# Patient Record
Sex: MALE | Race: WHITE | NOT HISPANIC OR LATINO | URBAN - NONMETROPOLITAN AREA
[De-identification: names, ages, dates, MRNs, and addresses within clinical notes are randomized per-mention and may not be internally consistent; named-entity substitution may affect disease eponyms.]

---

## 2017-01-24 ENCOUNTER — OFFICE VISIT (OUTPATIENT)
Dept: FAMILY MEDICINE CLINIC | Facility: CLINIC | Age: 32
End: 2017-01-24

## 2017-01-24 VITALS
SYSTOLIC BLOOD PRESSURE: 112 MMHG | TEMPERATURE: 98 F | OXYGEN SATURATION: 99 % | DIASTOLIC BLOOD PRESSURE: 82 MMHG | WEIGHT: 174 LBS | HEART RATE: 45 BPM | BODY MASS INDEX: 23.57 KG/M2 | HEIGHT: 72 IN

## 2017-01-24 DIAGNOSIS — M54.2 NECK PAIN: Primary | ICD-10-CM

## 2017-01-24 DIAGNOSIS — M54.9 MID BACK PAIN: ICD-10-CM

## 2017-01-24 PROCEDURE — 99202 OFFICE O/P NEW SF 15 MIN: CPT | Performed by: NURSE PRACTITIONER

## 2017-01-24 RX ORDER — CYCLOBENZAPRINE HCL 5 MG
5 TABLET ORAL 3 TIMES DAILY PRN
Qty: 30 TABLET | Refills: 1 | Status: SHIPPED | OUTPATIENT
Start: 2017-01-24 | End: 2017-02-07 | Stop reason: SDUPTHER

## 2017-01-24 NOTE — PROGRESS NOTES
Subjective   Casimiro Love is a 31 y.o. male.     History of Present Illness     Neck and neck pain  Casimiro is a new patient to the clinic here today to establish care and discuss some back and neck issues he's been having for about 8 years or so.  He reports pain in his mid back on the right side radiating up to the neck and down the right arm.  He does have some numbness and tingling in his right arm periodically.  In the past he has been seen by previous PCP for this, they gave him some exercises to try which did help.  He also did massage therapy through PT which helped tremendously.  No x-rays have been performed he's aware.  He denies any actual injury.    The following portions of the patient's history were reviewed and updated as appropriate: allergies, current medications, past family history, past medical history, past social history, past surgical history and problem list.    Review of Systems   Constitutional: Negative.    Respiratory: Negative.    Cardiovascular: Negative.    Genitourinary: Negative.    Musculoskeletal: Positive for back pain and neck pain. Negative for arthralgias, joint swelling, myalgias and neck stiffness.   Neurological: Negative.        Objective   Physical Exam   Constitutional: He appears well-developed and well-nourished.   HENT:   Head: Normocephalic.   Eyes: Conjunctivae are normal.   Neck: Normal range of motion. Neck supple.   Cardiovascular: Normal rate and regular rhythm.    Pulmonary/Chest: Effort normal and breath sounds normal.   Musculoskeletal:   Full active range of motion of the lower and mid back as well as neck.  He does have palpated paraspinal muscle spasms on the right side as well as the left, 2+ upper extremity DTRs with a  equal .       Assessment/Plan   Casimiro was seen today for establish care.    Diagnoses and all orders for this visit:    Neck pain    Mid back pain    Other orders  -     cyclobenzaprine (FLEXERIL) 5 MG tablet; Take 1 tablet by  mouth 3 (Three) Times a Day As Needed for muscle spasms.     seems most of his issues are musculoskeletal as far as muscle spasm.  He has been buying some type of muscle relaxer off the Internet which is helped a little bit he is out..  In the past massage therapies worked greatly.  He does now have some pierced he just on the right arm periodically, I may try to treat him with a muscle relaxer for now, I would like to see him back in 2 weeks and we'll reassess his problems.  We will then determine if x-ray of the neck is needed or physical therapy.

## 2017-01-24 NOTE — MR AVS SNAPSHOT
Casimiro Love   1/24/2017 8:00 AM   Office Visit    Dept Phone:  [phone]   Encounter #:  [id number]    Provider:  YULI Morgan   Department:  [facility] MEDICAL [facility] FAMILY MEDICINE                Your Full Care Plan              Today's Medication Changes          These changes are accurate as of: 1/24/17  8:23 AM.  If you have any questions, ask your nurse or doctor.               New Medication(s)Ordered:     cyclobenzaprine 5 MG tablet   Commonly known as:  FLEXERIL   Take 1 tablet by mouth 3 (Three) Times a Day As Needed for muscle spasms.   Started by:  YULI Morgan            Where to Get Your Medications      These [facility] [place] [address] - [address] [facility] [phone] [place] [phone]   [address] [facility] [address]     Phone:  [phone]     cyclobenzaprine 5 MG tablet                  Your Updated Medication List          This list is accurate as of: 1/24/17  8:23 AM.  Always use your most recent med list.                cyclobenzaprine 5 MG tablet   Commonly known as:  FLEXERIL   Take 1 tablet by mouth 3 (Three) Times a Day As Needed for muscle spasms.               You Were Diagnosed With        Codes Comments    Neck pain    -  Primary ICD-10-CM: M54.2  ICD-9-CM: 723.1     Mid back pain     ICD-10-CM: M54.9  ICD-9-CM: 724.5       Instructions     None    Patient Instructions History      Upcoming Appointments     Visit Type Date Time Department    NEW PATIENT 1/24/2017  8:00 AM [facility]      [employer] Signup     [email or web address] allows you to send messages to your doctor, view your test results, renew your prescriptions, schedule appointments, and more. To sign up, go to "[employer]" and click on the Sign Up Now link in the New User? box. Enter your [employer] Activation Code exactly as it appears below along with the last four digits of your Social Security  "Number and your Date of Birth () to complete the sign-up process. If you do not sign up before the expiration date, you must request a new code.    [employer] Activation Code: [email or web address]  Expires: 2017  7:48 AM    If you have questions, you can email [email or web address] or call [phone] to talk to our [employer] staff. Remember, [employer] is NOT to be used for urgent needs. For medical emergencies, dial 911.               Other Info from Your Visit           Allergies     No Known Allergies      Reason for Visit     Establish Care back pain radiates up to neck and shoulder      Vital Signs     Blood Pressure Pulse Temperature Height Weight Oxygen Saturation    112/82 45 98 °F (36.7 °C) 72\" (182.9 cm) 174 lb (78.9 kg) 99%    Body Mass Index Smoking Status                23.6 kg/m2 Never Smoker          Problems and Diagnoses Noted     Neck pain    -  Primary    Mid back pain            "

## 2017-02-07 ENCOUNTER — OFFICE VISIT (OUTPATIENT)
Dept: FAMILY MEDICINE CLINIC | Facility: CLINIC | Age: 32
End: 2017-02-07

## 2017-02-07 VITALS
BODY MASS INDEX: 23.98 KG/M2 | TEMPERATURE: 97.1 F | OXYGEN SATURATION: 98 % | DIASTOLIC BLOOD PRESSURE: 78 MMHG | WEIGHT: 177 LBS | HEIGHT: 72 IN | SYSTOLIC BLOOD PRESSURE: 108 MMHG | HEART RATE: 54 BPM

## 2017-02-07 DIAGNOSIS — M54.2 NECK PAIN: Primary | ICD-10-CM

## 2017-02-07 PROCEDURE — 99212 OFFICE O/P EST SF 10 MIN: CPT | Performed by: NURSE PRACTITIONER

## 2017-02-07 RX ORDER — CYCLOBENZAPRINE HCL 5 MG
5 TABLET ORAL 2 TIMES DAILY PRN
Qty: 45 TABLET | Refills: 1 | Status: SHIPPED | OUTPATIENT
Start: 2017-02-07 | End: 2017-04-12

## 2017-02-07 NOTE — PROGRESS NOTES
Subjective   Casimiro Love is a 31 y.o. male.     History of Present Illness     Follow-up neck pain  Aldo is here today for two-week follow-up.  The muscle relaxer did help him a little however he is unable to take this throughout the day due to sedation.  In the past physical therapy and massage therapy has helped him tremendously, he like to try that at this time.  X-ray of the neck done 2 weeks ago showed some mild degenerative changes only.    The following portions of the patient's history were reviewed and updated as appropriate: allergies, current medications, past family history, past medical history, past social history, past surgical history and problem list.    Review of Systems   Musculoskeletal: Neck pain: chronic neck pain.       Objective   Physical Exam   Constitutional: He appears well-developed and well-nourished. No distress.   Neck: Normal range of motion. Neck supple.   Musculoskeletal: Normal range of motion. He exhibits no tenderness.   Neurological: He is alert. No cranial nerve deficit.   Vitals reviewed.      Assessment/Plan   Casimiro was seen today for follow-up.    Diagnoses and all orders for this visit:    Neck pain  -     Ambulatory Referral to Physical Therapy    Other orders  -     cyclobenzaprine (FLEXERIL) 5 MG tablet; Take 1 tablet by mouth 2 (Two) Times a Day As Needed for muscle spasms.     I have refilled his Flexeril today, he is getting some relief with the 5 mg.  I'm going to refer him to [facility] for physical therapy, if pain persists we will move forward with obtaining an MRI

## 2017-02-17 ENCOUNTER — TELEPHONE (OUTPATIENT)
Dept: FAMILY MEDICINE CLINIC | Facility: CLINIC | Age: 32
End: 2017-02-17

## 2017-04-12 ENCOUNTER — OFFICE VISIT (OUTPATIENT)
Dept: FAMILY MEDICINE CLINIC | Facility: CLINIC | Age: 32
End: 2017-04-12

## 2017-04-12 VITALS
OXYGEN SATURATION: 99 % | DIASTOLIC BLOOD PRESSURE: 72 MMHG | WEIGHT: 168 LBS | HEIGHT: 72 IN | BODY MASS INDEX: 22.75 KG/M2 | SYSTOLIC BLOOD PRESSURE: 122 MMHG | HEART RATE: 64 BPM

## 2017-04-12 DIAGNOSIS — M54.50 CHRONIC BILATERAL LOW BACK PAIN WITHOUT SCIATICA: ICD-10-CM

## 2017-04-12 DIAGNOSIS — G89.29 CHRONIC PAIN OF RIGHT ANKLE: Primary | ICD-10-CM

## 2017-04-12 DIAGNOSIS — M25.571 CHRONIC PAIN OF RIGHT ANKLE: Primary | ICD-10-CM

## 2017-04-12 DIAGNOSIS — G89.29 CHRONIC BILATERAL LOW BACK PAIN WITHOUT SCIATICA: ICD-10-CM

## 2017-04-12 PROCEDURE — 99213 OFFICE O/P EST LOW 20 MIN: CPT | Performed by: NURSE PRACTITIONER

## 2017-04-12 RX ORDER — CYCLOBENZAPRINE HCL 10 MG
10 TABLET ORAL 2 TIMES DAILY PRN
Qty: 30 TABLET | Refills: 2 | Status: SHIPPED | OUTPATIENT
Start: 2017-04-12

## 2017-04-12 RX ORDER — MELOXICAM 7.5 MG/1
TABLET ORAL
Qty: 30 TABLET | Refills: 1 | Status: SHIPPED | OUTPATIENT
Start: 2017-04-12

## 2017-04-12 NOTE — PROGRESS NOTES
"Subjective   Casimiro Love is a 32 y.o. male.     History of Present Illness   Casimiro is here today with complaint of right ankle pain and stiffness that is worse in the morning, he exercises to \"loosen\" the joint before going to work. He had a right ankle injury with \"microfractures\" 7 years ago. States the ankle swells by the end of the day.  Patient also wants a refill for Flexeril at a higher dose for back pain.  Denies chest pain, shortness of breath, or dizziness.    The following portions of the patient's history were reviewed and updated as appropriate: allergies, current medications, past family history, past medical history, past social history, past surgical history and problem list.    Review of Systems   Constitutional: Negative.    HENT: Negative.    Eyes: Negative.    Respiratory: Negative.    Cardiovascular: Negative.    Gastrointestinal: Negative.    Endocrine: Negative.    Genitourinary: Negative.    Musculoskeletal: Positive for arthralgias, back pain and joint swelling.   Skin: Negative.    Allergic/Immunologic: Negative.    Neurological: Negative.    Hematological: Negative.    Psychiatric/Behavioral: Negative.        Objective   Physical Exam   Constitutional: He is oriented to person, place, and time. He appears well-developed and well-nourished. No distress.   HENT:   Head: Normocephalic and atraumatic.   Right Ear: External ear normal.   Left Ear: External ear normal.   Mouth/Throat: Oropharynx is clear and moist. No oropharyngeal exudate.   Eyes: Conjunctivae and EOM are normal. No scleral icterus.   Neck: Normal range of motion. Neck supple. No thyromegaly present.   Cardiovascular: Normal rate, regular rhythm and normal heart sounds.    No murmur heard.  No bruit   Pulmonary/Chest: Effort normal and breath sounds normal.   Musculoskeletal: He exhibits tenderness. He exhibits no edema or deformity.   Full ROM of right ankle, slight tenderness to palpation at anterior aspect of ankle, " no crepitus noted, but ankle did pop with movement   Neurological: He is alert and oriented to person, place, and time.   Skin: Skin is warm and dry.   Psychiatric: He has a normal mood and affect. His behavior is normal. Judgment and thought content normal.   Nursing note and vitals reviewed.      Assessment/Plan   Casimiro was seen today for ankle pain.    Diagnoses and all orders for this visit:    Chronic pain of right ankle  -     meloxicam (MOBIC) 7.5 MG tablet; Take 1 PO daily prn    Chronic bilateral low back pain without sciatica  -     cyclobenzaprine (FLEXERIL) 10 MG tablet; Take 1 tablet by mouth 2 (Two) Times a Day As Needed for Muscle Spasms.      Meloxicam prescribed for ankle pain, gave patient a written order for a right ankle xray to evaluate for stress fracture or arthritis. If indicated, I will refer patient to ortho for further evaluation.  Flexeril prescribed for low back pain.  Patient was encouraged to keep me informed of any acute changes, lack of improvement, or any new concerning symptoms.  Written by Kateryna ROWE, APRN student, acting as a scribe for YULI Reagan  This note accurately reflects work and decisions made by myself, Anjelica BARAJAS

## 2017-04-14 ENCOUNTER — TELEPHONE (OUTPATIENT)
Dept: FAMILY MEDICINE CLINIC | Facility: CLINIC | Age: 32
End: 2017-04-14

## 2017-04-14 NOTE — TELEPHONE ENCOUNTER
----- Message from YULI Morgan sent at 4/14/2017  8:43 AM EDT -----  Let him know his ankle xray did not show any soft tissue or bony abnormality